# Patient Record
Sex: FEMALE | Race: WHITE | NOT HISPANIC OR LATINO | Employment: FULL TIME | URBAN - METROPOLITAN AREA
[De-identification: names, ages, dates, MRNs, and addresses within clinical notes are randomized per-mention and may not be internally consistent; named-entity substitution may affect disease eponyms.]

---

## 2021-05-25 ENCOUNTER — RECORDS - HEALTHEAST (OUTPATIENT)
Dept: ADMINISTRATIVE | Facility: CLINIC | Age: 36
End: 2021-05-25

## 2023-12-08 ENCOUNTER — HOSPITAL ENCOUNTER (EMERGENCY)
Facility: HOSPITAL | Age: 38
Discharge: HOME | End: 2023-12-09
Attending: EMERGENCY MEDICINE | Admitting: EMERGENCY MEDICINE
Payer: COMMERCIAL

## 2023-12-08 DIAGNOSIS — R07.89 CHEST WALL PAIN: ICD-10-CM

## 2023-12-08 LAB
ACANTHOCYTES BLD QL SMEAR: NORMAL
ANION GAP SERPL CALCULATED.3IONS-SCNC: 11 MMOL/L (ref 7–15)
AUER BODIES BLD QL SMEAR: NORMAL
BASO STIPL BLD QL SMEAR: NORMAL
BASOPHILS # BLD AUTO: 0 10E3/UL (ref 0–0.2)
BASOPHILS NFR BLD AUTO: 0 %
BITE CELLS BLD QL SMEAR: NORMAL
BLISTER CELLS BLD QL SMEAR: NORMAL
BUN SERPL-MCNC: 13.6 MG/DL (ref 6–20)
BURR CELLS BLD QL SMEAR: NORMAL
CALCIUM SERPL-MCNC: 9 MG/DL (ref 8.6–10)
CHLORIDE SERPL-SCNC: 101 MMOL/L (ref 98–107)
CREAT SERPL-MCNC: 0.73 MG/DL (ref 0.51–0.95)
D DIMER PPP FEU-MCNC: <0.27 UG/ML FEU (ref 0–0.5)
DACRYOCYTES BLD QL SMEAR: NORMAL
DEPRECATED HCO3 PLAS-SCNC: 26 MMOL/L (ref 22–29)
EGFRCR SERPLBLD CKD-EPI 2021: >90 ML/MIN/1.73M2
ELLIPTOCYTES BLD QL SMEAR: NORMAL
EOSINOPHIL # BLD AUTO: 0.7 10E3/UL (ref 0–0.7)
EOSINOPHIL NFR BLD AUTO: 6 %
ERYTHROCYTE [DISTWIDTH] IN BLOOD BY AUTOMATED COUNT: 13.7 % (ref 10–15)
FRAGMENTS BLD QL SMEAR: NORMAL
GLUCOSE SERPL-MCNC: 100 MG/DL (ref 70–99)
HCT VFR BLD AUTO: 35.9 % (ref 35–47)
HGB BLD-MCNC: 11.5 G/DL (ref 11.7–15.7)
HGB C CRYSTALS: NORMAL
HOWELL-JOLLY BOD BLD QL SMEAR: NORMAL
IMM GRANULOCYTES # BLD: 0.1 10E3/UL
IMM GRANULOCYTES NFR BLD: 1 %
LYMPHOCYTES # BLD AUTO: 5.8 10E3/UL (ref 0.8–5.3)
LYMPHOCYTES NFR BLD AUTO: 44 %
MCH RBC QN AUTO: 26.3 PG (ref 26.5–33)
MCHC RBC AUTO-ENTMCNC: 32 G/DL (ref 31.5–36.5)
MCV RBC AUTO: 82 FL (ref 78–100)
MONOCYTES # BLD AUTO: 0.9 10E3/UL (ref 0–1.3)
MONOCYTES NFR BLD AUTO: 7 %
NEUTROPHILS # BLD AUTO: 5.4 10E3/UL (ref 1.6–8.3)
NEUTROPHILS NFR BLD AUTO: 42 %
NEUTS HYPERSEG BLD QL SMEAR: NORMAL
NRBC # BLD AUTO: 0 10E3/UL
NRBC BLD AUTO-RTO: 0 /100
PLAT MORPH BLD: NORMAL
PLATELET # BLD AUTO: 347 10E3/UL (ref 150–450)
POLYCHROMASIA BLD QL SMEAR: NORMAL
POTASSIUM SERPL-SCNC: 3.9 MMOL/L (ref 3.4–5.3)
RBC # BLD AUTO: 4.37 10E6/UL (ref 3.8–5.2)
RBC AGGLUT BLD QL: NORMAL
RBC MORPH BLD: NORMAL
ROULEAUX BLD QL SMEAR: NORMAL
SICKLE CELLS BLD QL SMEAR: NORMAL
SMUDGE CELLS BLD QL SMEAR: NORMAL
SODIUM SERPL-SCNC: 138 MMOL/L (ref 135–145)
SPHEROCYTES BLD QL SMEAR: NORMAL
STOMATOCYTES BLD QL SMEAR: NORMAL
TARGETS BLD QL SMEAR: NORMAL
TOXIC GRANULES BLD QL SMEAR: NORMAL
TROPONIN T SERPL HS-MCNC: <6 NG/L
VARIANT LYMPHS BLD QL SMEAR: NORMAL
WBC # BLD AUTO: 12.9 10E3/UL (ref 4–11)

## 2023-12-08 PROCEDURE — 250N000011 HC RX IP 250 OP 636: Mod: JZ | Performed by: EMERGENCY MEDICINE

## 2023-12-08 PROCEDURE — 85379 FIBRIN DEGRADATION QUANT: CPT | Performed by: EMERGENCY MEDICINE

## 2023-12-08 PROCEDURE — 93005 ELECTROCARDIOGRAM TRACING: CPT | Performed by: EMERGENCY MEDICINE

## 2023-12-08 PROCEDURE — 36415 COLL VENOUS BLD VENIPUNCTURE: CPT | Performed by: STUDENT IN AN ORGANIZED HEALTH CARE EDUCATION/TRAINING PROGRAM

## 2023-12-08 PROCEDURE — 84484 ASSAY OF TROPONIN QUANT: CPT | Performed by: EMERGENCY MEDICINE

## 2023-12-08 PROCEDURE — 85025 COMPLETE CBC W/AUTO DIFF WBC: CPT | Performed by: EMERGENCY MEDICINE

## 2023-12-08 PROCEDURE — 96374 THER/PROPH/DIAG INJ IV PUSH: CPT

## 2023-12-08 PROCEDURE — 80048 BASIC METABOLIC PNL TOTAL CA: CPT | Performed by: EMERGENCY MEDICINE

## 2023-12-08 PROCEDURE — 99285 EMERGENCY DEPT VISIT HI MDM: CPT | Mod: 25

## 2023-12-08 RX ORDER — KETOROLAC TROMETHAMINE 15 MG/ML
15 INJECTION, SOLUTION INTRAMUSCULAR; INTRAVENOUS ONCE
Status: COMPLETED | OUTPATIENT
Start: 2023-12-08 | End: 2023-12-08

## 2023-12-08 RX ADMIN — KETOROLAC TROMETHAMINE 15 MG: 15 INJECTION, SOLUTION INTRAMUSCULAR; INTRAVENOUS at 23:07

## 2023-12-08 ASSESSMENT — ACTIVITIES OF DAILY LIVING (ADL): ADLS_ACUITY_SCORE: 35

## 2023-12-09 ENCOUNTER — APPOINTMENT (OUTPATIENT)
Dept: RADIOLOGY | Facility: HOSPITAL | Age: 38
End: 2023-12-09
Attending: EMERGENCY MEDICINE
Payer: COMMERCIAL

## 2023-12-09 VITALS
SYSTOLIC BLOOD PRESSURE: 115 MMHG | WEIGHT: 197 LBS | HEIGHT: 66 IN | TEMPERATURE: 98.6 F | HEART RATE: 83 BPM | BODY MASS INDEX: 31.66 KG/M2 | OXYGEN SATURATION: 96 % | DIASTOLIC BLOOD PRESSURE: 69 MMHG | RESPIRATION RATE: 23 BRPM

## 2023-12-09 LAB
HOLD SPECIMEN: NORMAL

## 2023-12-09 PROCEDURE — 71045 X-RAY EXAM CHEST 1 VIEW: CPT

## 2023-12-09 ASSESSMENT — ACTIVITIES OF DAILY LIVING (ADL): ADLS_ACUITY_SCORE: 35

## 2023-12-09 NOTE — ED PROVIDER NOTES
"EMERGENCY DEPARTMENT NOTE     Name: Iliana Lane    Age/Sex: 38 year old female   MRN: [id number]   Evaluation Date & Time:  12/8/2023 10:28 PM    PCP:    Yoselin Gonzales   ED Provider: Michael Singh D.O.       CHIEF COMPLAINT    Pleurisy       DIAGNOSIS & DISPOSITION/MEDICAL DECISION MAKING   No diagnosis found.    Iliana Lane is a 38 year old year old female with a relevant past history PCOS, migraines, JAXSON, prediabetes who presents to this ED by walk in for evaluation of chest pain.    Emergent causes of chest pain considered included but not limited to ACS, myocarditis/pericarditis, pulmonary embolism, thoracic aortic dissection, pneumothorax.  Patient does not have associated abdominal pain or history of vomiting to suggest Boerhaave syndrome    Medical Decision Making  Patient on exam and normal cardiopulmonary exam.  Patient  had reproducible chest wall tenderness at the left sternal costal border.  EKG nonischemic, troponin and D-dimer not elevated.  Chest x-ray without evidence of pneumothorax or  infiltrate ,mediastinum appeared normal.  Low suspicion for thoracic aortic dissection and further evaluation with CTA was not pursued.  Patient will be discharged.  Continue Tylenol or ibuprofen for pain management.  Follow-up with primary care physician early next week.  If recurrent pain not improved with Tylenol ibuprofen or develops any new symptoms including shortness of breath will return to the emergency department.      Interventions: IV ketorolac  Discharge Vital Signs:/69   Pulse 83   Temp 98.6  F (37  C) (Oral)   Resp 23   Ht 1.676 m (5' 6\")   Wt 89.4 kg (197 lb)   LMP 11/29/2023   SpO2 96%   BMI 31.80 kg/m       DISPOSITION: Home    Diagnostic studies:  Imaging:  No orders to display      Lab:  Labs Ordered and Resulted from Time of ED Arrival to Time of ED Departure   BASIC METABOLIC PANEL - Abnormal       Result Value    Sodium 138      Potassium 3.9      Chloride 101   "    Carbon Dioxide (CO2) 26      Anion Gap 11      Urea Nitrogen 13.6      Creatinine 0.73      GFR Estimate >90      Calcium 9.0      Glucose 100 (*)    CBC WITH PLATELETS AND DIFFERENTIAL - Abnormal    WBC Count 12.9 (*)     RBC Count 4.37      Hemoglobin 11.5 (*)     Hematocrit 35.9      MCV 82      MCH 26.3 (*)     MCHC 32.0      RDW 13.7      Platelet Count 347      % Neutrophils 42      % Lymphocytes 44      % Monocytes 7      % Eosinophils 6      % Basophils 0      % Immature Granulocytes 1      NRBCs per 100 WBC 0      Absolute Neutrophils 5.4      Absolute Lymphocytes 5.8 (*)     Absolute Monocytes 0.9      Absolute Eosinophils 0.7      Absolute Basophils 0.0      Absolute Immature Granulocytes 0.1      Absolute NRBCs 0.0     D DIMER QUANTITATIVE - Normal    D-Dimer Quantitative <0.27     TROPONIN T, HIGH SENSITIVITY - Normal    Troponin T, High Sensitivity <6     RBC AND PLATELET MORPHOLOGY    Platelet Assessment        Value: Automated Count Confirmed. Platelet morphology is normal.    Acanthocytes        Lionel Rods        Basophilic Stippling        Bite Cells        Blister Cells        Neoga Cells        Elliptocytes        Hgb C Crystals        Mantilla-Jolly Bodies        Hypersegmented Neutrophils        Polychromasia        RBC agglutination        RBC Fragments        Reactive Lymphocytes        Rouleaux        Sickle Cells        Smudge Cells        Spherocytes        Stomatocytes        Target Cells        Teardrop Cells        Toxic Neutrophils        RBC Morphology Confirmed RBC Indices     HCG QUALITATIVE URINE               Triage note reviewed:Pleurisy in chest and some pain left side of neck and down left arm. Diagnosed with pneumonia in Nov. Is currently off antibiotics. Did not take anything today for the pain. Pain has been ongoing for a few days. Hurts more to push on chest.      Triage Assessment (Adult)       Row Name 12/08/23 5275          Triage Assessment    Airway WDL WDL                          History:  Supplemental history from: Patient's sister-in-law  External Record(s) reviewed: Urgent care visit November 20 and December 4, primary care office visit December 5, 2023    Work Up:  Chart documentation includes differential considered and any EKGs or imaging independently interpreted by provider, where specified.  In additional to work up documented, I considered the following work up: CT of the chest but deferred secondary low suspicion for thoracic aortic dissection    External consultation:  Discussion of management with another provider: NA    Complicating factors:  Care impacted by chronic illness: N/A  Care affected by social determinants of health: N/A    Disposition considerations: Discharge. No recommendations on prescription strength medication(s). N/A.    At the conclusion of the encounter I discussed the results of all of the tests and the disposition. The questions were answered. The patient or family acknowledged understanding and was agreeable with the care plan.    TOTAL CRITICAL CARE TIME (EXCLUDING PROCEDURES): Not applicable    PROCEDURES:   None    EMERGENCY DEPARTMENT COURSE   10:46 PM I met with the patient to gather history and to perform my initial exam.  We discussed treatment options and the plan for care while in the Emergency Department.    ED INTERVENTIONS     Medications   ketorolac (TORADOL) injection 15 mg (15 mg Intravenous $Given 12/8/23 8791)       DISCHARGE MEDICATIONS        Review of your medicines        UNREVIEWED medicines. Ask your doctor about these medicines        Dose / Directions   cetirizine 10 MG tablet  Commonly known as: zyrTEC      Dose: 10 mg  [CETIRIZINE (ZYRTEC) 10 MG TABLET] Take 10 mg by mouth daily.  Refills: 0     EPIPEN 2-HUGO IJ      [EPINEPHRINE (EPIPEN 2-HUGO INJ)] Inject as directed.  Refills: 0     metFORMIN 850 MG tablet  Commonly known as: GLUCOPHAGE      Dose: 850 mg  [METFORMIN (GLUCOPHAGE) 850 MG TABLET] Take 850 mg by  mouth 2 (two) times a day with meals.  Refills: 0     METOPROLOL SUCCINATE ER PO      Dose: 50 mg  [METOPROLOL SUCCINATE ORAL] Take 50 mg by mouth 2 (two) times a day.  Refills: 0     MULTIPLE VITAMIN PO      [MULTIVITAMIN (MULTIPLE VITAMIN ORAL)] Take by mouth.  Refills: 0     norelgestromin-ethinyl estradiol 150-35 MCG/24HR patch  Commonly known as: ORTHO EVRA      Dose: 1 patch  [NORELGESTROMIN-ETHINYL ESTRADIOL (ORTHO EVRA) 150-35 MCG/24 HR] Place 1 patch on the skin once a week.  Refills: 0     omeprazole 20 MG DR capsule  Commonly known as: PriLOSEC      Dose: 20 mg  [OMEPRAZOLE (PRILOSEC) 20 MG CAPSULE] Take 20 mg by mouth daily.  Refills: 0     ondansetron 4 MG ODT tab  Commonly known as: ZOFRAN ODT      Dose: 4 mg  [ONDANSETRON (ZOFRAN-ODT) 4 MG DISINTEGRATING TABLET] Take 4 mg by mouth every 8 (eight) hours as needed for nausea.  Refills: 0     PAROXETINE HCL PO      Dose: 20 mg  [PAROXETINE HCL (PAXIL ORAL)] Take 20 mg by mouth daily.  Refills: 0     rizatriptan 10 MG ODT  Commonly known as: MAXALT-MLT      Dose: 10 mg  [RIZATRIPTAN (MAXALT-MLT) 10 MG DISINTEGRATING TABLET] Take 10 mg by mouth as needed for migraine. May repeat in 2 hours if needed  Refills: 0     venlafaxine 150 MG 24 hr capsule  Commonly known as: EFFEXOR XR      Dose: 150 mg  [VENLAFAXINE (EFFEXOR-XR) 150 MG 24 HR CAPSULE] Take 150 mg by mouth daily.  Refills: 0            CONTINUE these medicines which have NOT CHANGED        Dose / Directions   coenzyme Q-10 10 MG Caps      Dose: 10 mg  [COENZYME Q10 10 MG CAPSULE] Take 10 mg by mouth daily.  Refills: 0     fish oil-omega-3 fatty acids 1000 MG capsule      Dose: 2 g  [OMEGA-3/DHA/EPA/FISH OIL (FISH OIL-OMEGA-3 FATTY ACIDS) 300-1,000 MG CAPSULE] Take 2 g by mouth daily.  Refills: 0     melatonin 3 MG tablet      Dose: 3 mg  [MELATONIN 3 MG TAB] Take 3 mg by mouth bedtime as needed.  Refills: 0                INFORMATION SOURCE AND LIMITATIONS    History/Exam limitations: None  Patient  "information was obtained from: Patient   Use of : N/A    HISTORY OF PRESENT ILLNESS   Iliana Lane is a 38 year old year old female with a relevant past history of vasovagal syncope, PCOS, migraines, JAXSON, prediabetes, and leukocytosis , who presents to this ED by walk in for evaluation of chest pain.    Patient reports that she diagnosed with pneumonia in November and she saw her PCP 3 days ago and had a CXR which showed she no longer as pneumonia. Patient says that she still has a cough. She says that 3 days ago she started having constant throbbing chest pain that radiates to her left neck and down her left arm. She also has chest pain with deep inspiration. She denies shortness of breath and history of blood clots.     REVIEW OF SYSTEMS:   All other systems reviewed and are negative except as noted above in HPI.    PATIENT HISTORY   No past medical history on file.  There is no problem list on file for this patient.    No past surgical history on file.    Allergies   Allergen Reactions    Bee Pollens [Bee Pollen] Unknown       OUTPATIENT MEDICATIONS     New Prescriptions    No medications on file      Vitals:    12/08/23 2225   BP: 111/63   Pulse: 80   Resp: 18   Temp: 98.6  F (37  C)   TempSrc: Oral   SpO2: 99%   Weight: 89.4 kg (197 lb)   Height: 1.676 m (5' 6\")       Physical Exam   Constitutional: Oriented to person, place, and time. Appears well-developed and well-nourished.   HEENT:   Neck: Normal range of motion. Neck supple.  No swelling or adenopathy  Cardiovascular: Normal rate, regular rhythm and normal heart sounds.    Pulmonary/Chest: Normal effort  and breath sounds normal. Reproducible chest wall tenderness after the left sternal costal border..   Musculoskeletal: Normal range of motion.  No calf swelling or erythema    DIAGNOSTICS    LABORATORY FINDINGS (REVIEWED AND INTERPRETED):  Labs Ordered and Resulted from Time of ED Arrival to Time of ED Departure   BASIC METABOLIC PANEL - " Abnormal       Result Value    Sodium 138      Potassium 3.9      Chloride 101      Carbon Dioxide (CO2) 26      Anion Gap 11      Urea Nitrogen 13.6      Creatinine 0.73      GFR Estimate >90      Calcium 9.0      Glucose 100 (*)    CBC WITH PLATELETS AND DIFFERENTIAL - Abnormal    WBC Count 12.9 (*)     RBC Count 4.37      Hemoglobin 11.5 (*)     Hematocrit 35.9      MCV 82      MCH 26.3 (*)     MCHC 32.0      RDW 13.7      Platelet Count 347      % Neutrophils 42      % Lymphocytes 44      % Monocytes 7      % Eosinophils 6      % Basophils 0      % Immature Granulocytes 1      NRBCs per 100 WBC 0      Absolute Neutrophils 5.4      Absolute Lymphocytes 5.8 (*)     Absolute Monocytes 0.9      Absolute Eosinophils 0.7      Absolute Basophils 0.0      Absolute Immature Granulocytes 0.1      Absolute NRBCs 0.0     D DIMER QUANTITATIVE - Normal    D-Dimer Quantitative <0.27     TROPONIN T, HIGH SENSITIVITY - Normal    Troponin T, High Sensitivity <6     RBC AND PLATELET MORPHOLOGY    Platelet Assessment        Value: Automated Count Confirmed. Platelet morphology is normal.    Acanthocytes        Lionel Rods        Basophilic Stippling        Bite Cells        Blister Cells        Kaiden Cells        Elliptocytes        Hgb C Crystals        Mantilla-Jolly Bodies        Hypersegmented Neutrophils        Polychromasia        RBC agglutination        RBC Fragments        Reactive Lymphocytes        Rouleaux        Sickle Cells        Smudge Cells        Spherocytes        Stomatocytes        Target Cells        Teardrop Cells        Toxic Neutrophils        RBC Morphology Confirmed RBC Indices     HCG QUALITATIVE URINE         IMAGING (REVIEWED AND INTERPRETED):  No orders to display         ECG (REVIEWED AND INTERPRETED):   ECG:   Performed at: 08-DEC-2023 22:48  HR:  82 bpm  Rhythm: Sinus rhythm  Axis: 52  QRS duration: 94  QTC: 462  ST changes: No ST segment elevation or depression, no T wave inversion,No Q  wave  Interpretation: Normal ECG  Compared to most recent ECG from: 26-JUN-2023, No significant change was found    I have reviewed the patient's ECG, with comments made as listed above. Please see scanned image for full interpretation.         I, Yg Mariano, am serving as a scribe to document services personally performed by Michael Singh D.O., based on my observation and the provider s statements to me.    I, Michael Singh D.O., attest that Yg Mariano is acting in a scribe capacity, has observed my performance of the services and has documented them in accordance with my direction.    Michael Singh D.O.  EMERGENCY MEDICINE   12/08/23  [facility] EMERGENCY DEPARTMENT  [address] [place] [id number]  [phone]  Dept: [phone]       Michael Singh DO  12/09/23 0132

## 2023-12-09 NOTE — DISCHARGE INSTRUCTIONS
Take ibuprofen 400 mg every 8 hours and Tylenol 650 m g every 6 hours for pain management.  If you have recurrent chest or neck pain does not improve with Tylenol or ibuprofen or develop any new symptoms including shortness of breath return to the emergency department.  Otherwise see your primary care physician follow-up next week.

## 2023-12-09 NOTE — ED TRIAGE NOTES
Pleurisy in chest and some pain left side of neck and down left arm. Diagnosed with pneumonia in Nov. Is currently off antibiotics. Did not take anything today for the pain. Pain has been ongoing for a few days. Hurts more to push on chest.      Triage Assessment (Adult)       Row Name 12/08/23 2226          Triage Assessment    Airway WDL WDL

## 2024-07-06 ENCOUNTER — HEALTH MAINTENANCE LETTER (OUTPATIENT)
Age: 39
End: 2024-07-06

## 2025-07-13 ENCOUNTER — HEALTH MAINTENANCE LETTER (OUTPATIENT)
Age: 40
End: 2025-07-13

## 2025-08-24 ENCOUNTER — HEALTH MAINTENANCE LETTER (OUTPATIENT)
Age: 40
End: 2025-08-24